# Patient Record
Sex: OTHER/UNKNOWN | Race: WHITE | URBAN - METROPOLITAN AREA
[De-identification: names, ages, dates, MRNs, and addresses within clinical notes are randomized per-mention and may not be internally consistent; named-entity substitution may affect disease eponyms.]

---

## 2017-11-29 ENCOUNTER — APPOINTMENT (OUTPATIENT)
Dept: URBAN - METROPOLITAN AREA CLINIC 290 | Age: 67
Setting detail: DERMATOLOGY
End: 2017-12-06

## 2017-11-29 DIAGNOSIS — D18.0 HEMANGIOMA: ICD-10-CM

## 2017-11-29 DIAGNOSIS — Z12.83 ENCOUNTER FOR SCREENING FOR MALIGNANT NEOPLASM OF SKIN: ICD-10-CM

## 2017-11-29 DIAGNOSIS — L08.0 PYODERMA: ICD-10-CM

## 2017-11-29 DIAGNOSIS — I78.8 OTHER DISEASES OF CAPILLARIES: ICD-10-CM

## 2017-11-29 DIAGNOSIS — L91.8 OTHER HYPERTROPHIC DISORDERS OF THE SKIN: ICD-10-CM

## 2017-11-29 PROBLEM — D48.5 NEOPLASM OF UNCERTAIN BEHAVIOR OF SKIN: Status: ACTIVE | Noted: 2017-11-29

## 2017-11-29 PROBLEM — L08.9 LOCAL INFECTION OF THE SKIN AND SUBCUTANEOUS TISSUE, UNSPECIFIED: Status: ACTIVE | Noted: 2017-11-29

## 2017-11-29 PROCEDURE — OTHER SKIN TAG REMOVAL: OTHER

## 2017-11-29 PROCEDURE — OTHER ORDER TESTS: OTHER

## 2017-11-29 PROCEDURE — 11200 RMVL SKIN TAGS UP TO&INC 15: CPT

## 2017-11-29 PROCEDURE — OTHER BIOPSY BY SHAVE METHOD: OTHER

## 2017-11-29 PROCEDURE — OTHER MIPS QUALITY: OTHER

## 2017-11-29 PROCEDURE — 99214 OFFICE O/P EST MOD 30 MIN: CPT | Mod: 25

## 2017-11-29 PROCEDURE — 11100: CPT | Mod: 59

## 2017-11-29 PROCEDURE — OTHER PATIENT SPECIFIC COUNSELING: OTHER

## 2017-11-29 PROCEDURE — OTHER COUNSELING: OTHER

## 2017-11-29 ASSESSMENT — LOCATION ZONE DERM
LOCATION ZONE: LEG
LOCATION ZONE: NOSE
LOCATION ZONE: TRUNK

## 2017-11-29 ASSESSMENT — LOCATION SIMPLE DESCRIPTION DERM
LOCATION SIMPLE: UPPER BACK
LOCATION SIMPLE: LEFT THIGH
LOCATION SIMPLE: LEFT PRETIBIAL REGION
LOCATION SIMPLE: RIGHT NOSE

## 2017-11-29 ASSESSMENT — LOCATION DETAILED DESCRIPTION DERM
LOCATION DETAILED: LEFT ANTERIOR PROXIMAL THIGH
LOCATION DETAILED: LEFT PROXIMAL PRETIBIAL REGION
LOCATION DETAILED: RIGHT NARIS
LOCATION DETAILED: SUPERIOR THORACIC SPINE

## 2017-11-29 NOTE — PROCEDURE: BIOPSY BY SHAVE METHOD
Destruction After The Procedure: Yes
Consent: Verbal consent was obtained. Warned of risk of scaring, bleeding, infection, hypo and hyper pigmentation and possible need for further treatment.
Detail Level: Detailed
Silver Nitrate Text: The wound bed was treated with silver nitrate after the biopsy was performed.
Billing Type: Third-Party Bill
Bill [id number] For Specimen Handling/Conveyance To Laboratory?: no
Electrodesiccation And Curettage Text: The wound bed was treated with electrodesiccation and curettage after the biopsy was performed.
Electrodesiccation Text: The wound bed was treated with electrodesiccation after the biopsy was performed.
Post-Care Instructions: Wash, apply Vaseline and a bandage daily until healed \\nImportance of follow up discussed
Wound Care: No ointment
Anesthesia Type: 1% lidocaine with 1:100,000 epinephrine and a 1:10 solution of 8.4% sodium bicarbonate
Path Notes (To The Dermatopathologist): 3mm\\nCliffel
Biopsy Method: Personna blade
Size Of Lesion In Cm: 0.3
Cryotherapy Text: The wound bed was treated with cryotherapy after the biopsy was performed.
Anesthesia Volume In Cc (Will Not Render If 0): 0.5
Biopsy Type: H and E
X Size Of Lesion In Cm: 0
Type Of Destruction Used: Electrodesiccation
Dressing: waterproof dressing
Hemostasis: Aluminum Chloride and Electrocautery

## 2017-11-29 NOTE — PROCEDURE: PATIENT SPECIFIC COUNSELING
Detail Level: Zone
If culture is negative, will prescribe metro gel. If positive will send in Mupirocin

## 2017-11-29 NOTE — PROCEDURE: SKIN TAG REMOVAL
Medical Necessity Information: It is in your best interest to select a reason for this procedure from the list below. All of these items fulfill various CMS LCD requirements except the new and changing color options.
Anesthesia Volume In Cc: 0
Include Z78.9 (Other Specified Conditions Influencing Health Status) As An Associated Diagnosis?: No
Medical Necessity Clause: Irritated, rubbing on clothing
Detail Level: Detailed
Consent: Verbal consent obtained and the risks of skin tag removal was reviewed with the patient including but not limited to bleeding, pigmentary change, infection, pain, and remote possibility of scarring.

## 2017-11-29 NOTE — HPI: EVALUATION OF SKIN LESION(S)
Hpi Title: Evaluation of Skin Lesions
Additional History: No bleeding or non healing lesions noticed.

## 2018-11-29 ENCOUNTER — APPOINTMENT (OUTPATIENT)
Dept: URBAN - METROPOLITAN AREA CLINIC 290 | Age: 68
Setting detail: DERMATOLOGY
End: 2018-12-19

## 2018-11-29 DIAGNOSIS — D22 MELANOCYTIC NEVI: ICD-10-CM

## 2018-11-29 DIAGNOSIS — Z12.83 ENCOUNTER FOR SCREENING FOR MALIGNANT NEOPLASM OF SKIN: ICD-10-CM

## 2018-11-29 DIAGNOSIS — L82.1 OTHER SEBORRHEIC KERATOSIS: ICD-10-CM

## 2018-11-29 PROBLEM — D22.5 MELANOCYTIC NEVI OF TRUNK: Status: ACTIVE | Noted: 2018-11-29

## 2018-11-29 PROCEDURE — OTHER COUNSELING: OTHER

## 2018-11-29 PROCEDURE — 99214 OFFICE O/P EST MOD 30 MIN: CPT

## 2018-11-29 PROCEDURE — OTHER MIPS QUALITY: OTHER

## 2018-11-29 ASSESSMENT — LOCATION DETAILED DESCRIPTION DERM
LOCATION DETAILED: LEFT MEDIAL UPPER BACK
LOCATION DETAILED: LEFT INFERIOR MEDIAL UPPER BACK

## 2018-11-29 ASSESSMENT — LOCATION ZONE DERM: LOCATION ZONE: TRUNK

## 2018-11-29 ASSESSMENT — LOCATION SIMPLE DESCRIPTION DERM: LOCATION SIMPLE: LEFT UPPER BACK
